# Patient Record
Sex: FEMALE | Race: OTHER | HISPANIC OR LATINO
[De-identification: names, ages, dates, MRNs, and addresses within clinical notes are randomized per-mention and may not be internally consistent; named-entity substitution may affect disease eponyms.]

---

## 2017-02-09 ENCOUNTER — TRANSCRIPTION ENCOUNTER (OUTPATIENT)
Age: 52
End: 2017-02-09

## 2017-05-04 ENCOUNTER — APPOINTMENT (OUTPATIENT)
Dept: FAMILY MEDICINE | Facility: CLINIC | Age: 52
End: 2017-05-04

## 2017-11-02 ENCOUNTER — APPOINTMENT (OUTPATIENT)
Dept: FAMILY MEDICINE | Facility: CLINIC | Age: 52
End: 2017-11-02
Payer: COMMERCIAL

## 2017-11-02 VITALS
TEMPERATURE: 98.1 F | BODY MASS INDEX: 21.62 KG/M2 | WEIGHT: 122 LBS | OXYGEN SATURATION: 99 % | HEART RATE: 89 BPM | DIASTOLIC BLOOD PRESSURE: 64 MMHG | HEIGHT: 63 IN | SYSTOLIC BLOOD PRESSURE: 110 MMHG

## 2017-11-02 DIAGNOSIS — R53.83 OTHER FATIGUE: ICD-10-CM

## 2017-11-02 DIAGNOSIS — L25.8 UNSPECIFIED CONTACT DERMATITIS DUE TO OTHER AGENTS: ICD-10-CM

## 2017-11-02 DIAGNOSIS — Z00.00 ENCOUNTER FOR GENERAL ADULT MEDICAL EXAMINATION W/OUT ABNORMAL FINDINGS: ICD-10-CM

## 2017-11-02 DIAGNOSIS — Z23 ENCOUNTER FOR IMMUNIZATION: ICD-10-CM

## 2017-11-02 PROCEDURE — 90686 IIV4 VACC NO PRSV 0.5 ML IM: CPT

## 2017-11-02 PROCEDURE — 99203 OFFICE O/P NEW LOW 30 MIN: CPT | Mod: 25

## 2017-11-02 PROCEDURE — 99213 OFFICE O/P EST LOW 20 MIN: CPT | Mod: 25

## 2017-11-02 PROCEDURE — G0008: CPT

## 2017-11-22 ENCOUNTER — APPOINTMENT (OUTPATIENT)
Dept: FAMILY MEDICINE | Facility: CLINIC | Age: 52
End: 2017-11-22

## 2017-11-30 DIAGNOSIS — E55.9 VITAMIN D DEFICIENCY, UNSPECIFIED: ICD-10-CM

## 2020-10-23 ENCOUNTER — EMERGENCY (EMERGENCY)
Facility: HOSPITAL | Age: 55
LOS: 1 days | Discharge: HOME | End: 2020-10-23
Attending: EMERGENCY MEDICINE
Payer: COMMERCIAL

## 2020-10-23 VITALS
RESPIRATION RATE: 20 BRPM | OXYGEN SATURATION: 98 % | HEART RATE: 81 BPM | DIASTOLIC BLOOD PRESSURE: 75 MMHG | SYSTOLIC BLOOD PRESSURE: 119 MMHG

## 2020-10-23 LAB
ALBUMIN SERPL ELPH-MCNC: 4.3 G/DL — SIGNIFICANT CHANGE UP (ref 3.3–5.2)
ALP SERPL-CCNC: 83 U/L — SIGNIFICANT CHANGE UP (ref 40–120)
ALT FLD-CCNC: 19 U/L — SIGNIFICANT CHANGE UP
ANION GAP SERPL CALC-SCNC: 13 MMOL/L — SIGNIFICANT CHANGE UP (ref 5–17)
AST SERPL-CCNC: 23 U/L — SIGNIFICANT CHANGE UP
BASOPHILS # BLD AUTO: 0.02 K/UL — SIGNIFICANT CHANGE UP (ref 0–0.2)
BASOPHILS NFR BLD AUTO: 0.5 % — SIGNIFICANT CHANGE UP (ref 0–2)
BILIRUB SERPL-MCNC: 0.3 MG/DL — LOW (ref 0.4–2)
BUN SERPL-MCNC: 14 MG/DL — SIGNIFICANT CHANGE UP (ref 8–20)
CALCIUM SERPL-MCNC: 9.2 MG/DL — SIGNIFICANT CHANGE UP (ref 8.6–10.2)
CHLORIDE SERPL-SCNC: 103 MMOL/L — SIGNIFICANT CHANGE UP (ref 98–107)
CO2 SERPL-SCNC: 25 MMOL/L — SIGNIFICANT CHANGE UP (ref 22–29)
CREAT SERPL-MCNC: 0.75 MG/DL — SIGNIFICANT CHANGE UP (ref 0.5–1.3)
EOSINOPHIL # BLD AUTO: 0.12 K/UL — SIGNIFICANT CHANGE UP (ref 0–0.5)
EOSINOPHIL NFR BLD AUTO: 2.7 % — SIGNIFICANT CHANGE UP (ref 0–6)
GLUCOSE SERPL-MCNC: 113 MG/DL — HIGH (ref 70–99)
HCG SERPL-ACNC: <4 MIU/ML — SIGNIFICANT CHANGE UP
HCT VFR BLD CALC: 37.6 % — SIGNIFICANT CHANGE UP (ref 34.5–45)
HGB BLD-MCNC: 12.6 G/DL — SIGNIFICANT CHANGE UP (ref 11.5–15.5)
IMM GRANULOCYTES NFR BLD AUTO: 0 % — SIGNIFICANT CHANGE UP (ref 0–1.5)
LYMPHOCYTES # BLD AUTO: 2.17 K/UL — SIGNIFICANT CHANGE UP (ref 1–3.3)
LYMPHOCYTES # BLD AUTO: 48.9 % — HIGH (ref 13–44)
MCHC RBC-ENTMCNC: 31.5 PG — SIGNIFICANT CHANGE UP (ref 27–34)
MCHC RBC-ENTMCNC: 33.5 GM/DL — SIGNIFICANT CHANGE UP (ref 32–36)
MCV RBC AUTO: 94 FL — SIGNIFICANT CHANGE UP (ref 80–100)
MONOCYTES # BLD AUTO: 0.35 K/UL — SIGNIFICANT CHANGE UP (ref 0–0.9)
MONOCYTES NFR BLD AUTO: 7.9 % — SIGNIFICANT CHANGE UP (ref 2–14)
NEUTROPHILS # BLD AUTO: 1.78 K/UL — LOW (ref 1.8–7.4)
NEUTROPHILS NFR BLD AUTO: 40 % — LOW (ref 43–77)
PLATELET # BLD AUTO: 200 K/UL — SIGNIFICANT CHANGE UP (ref 150–400)
POTASSIUM SERPL-MCNC: 4.1 MMOL/L — SIGNIFICANT CHANGE UP (ref 3.5–5.3)
POTASSIUM SERPL-SCNC: 4.1 MMOL/L — SIGNIFICANT CHANGE UP (ref 3.5–5.3)
PROT SERPL-MCNC: 6.9 G/DL — SIGNIFICANT CHANGE UP (ref 6.6–8.7)
RBC # BLD: 4 M/UL — SIGNIFICANT CHANGE UP (ref 3.8–5.2)
RBC # FLD: 13.2 % — SIGNIFICANT CHANGE UP (ref 10.3–14.5)
SODIUM SERPL-SCNC: 141 MMOL/L — SIGNIFICANT CHANGE UP (ref 135–145)
WBC # BLD: 4.44 K/UL — SIGNIFICANT CHANGE UP (ref 3.8–10.5)
WBC # FLD AUTO: 4.44 K/UL — SIGNIFICANT CHANGE UP (ref 3.8–10.5)

## 2020-10-23 PROCEDURE — 85025 COMPLETE CBC W/AUTO DIFF WBC: CPT

## 2020-10-23 PROCEDURE — 96374 THER/PROPH/DIAG INJ IV PUSH: CPT

## 2020-10-23 PROCEDURE — 80053 COMPREHEN METABOLIC PANEL: CPT

## 2020-10-23 PROCEDURE — 36415 COLL VENOUS BLD VENIPUNCTURE: CPT

## 2020-10-23 PROCEDURE — 96361 HYDRATE IV INFUSION ADD-ON: CPT

## 2020-10-23 PROCEDURE — 84702 CHORIONIC GONADOTROPIN TEST: CPT

## 2020-10-23 PROCEDURE — 99284 EMERGENCY DEPT VISIT MOD MDM: CPT | Mod: 25

## 2020-10-23 PROCEDURE — 96375 TX/PRO/DX INJ NEW DRUG ADDON: CPT

## 2020-10-23 PROCEDURE — 99284 EMERGENCY DEPT VISIT MOD MDM: CPT

## 2020-10-23 RX ORDER — SODIUM CHLORIDE 9 MG/ML
1000 INJECTION INTRAMUSCULAR; INTRAVENOUS; SUBCUTANEOUS ONCE
Refills: 0 | Status: COMPLETED | OUTPATIENT
Start: 2020-10-23 | End: 2020-10-23

## 2020-10-23 RX ORDER — FAMOTIDINE 10 MG/ML
20 INJECTION INTRAVENOUS ONCE
Refills: 0 | Status: COMPLETED | OUTPATIENT
Start: 2020-10-23 | End: 2020-10-23

## 2020-10-23 RX ADMIN — SODIUM CHLORIDE 1000 MILLILITER(S): 9 INJECTION INTRAMUSCULAR; INTRAVENOUS; SUBCUTANEOUS at 16:00

## 2020-10-23 RX ADMIN — FAMOTIDINE 20 MILLIGRAM(S): 10 INJECTION INTRAVENOUS at 14:00

## 2020-10-23 RX ADMIN — Medication 125 MILLIGRAM(S): at 14:00

## 2020-10-23 RX ADMIN — SODIUM CHLORIDE 1000 MILLILITER(S): 9 INJECTION INTRAMUSCULAR; INTRAVENOUS; SUBCUTANEOUS at 14:16

## 2020-10-23 NOTE — ED ADULT NURSE NOTE - OBJECTIVE STATEMENT
A&Ox3, resp wnl, c/o allergic reaction to face,periorbital swelling noted, whole face swollen denies difficulty breathing,eyes almost swollen shut, used coconut oil on face last night and today

## 2020-10-23 NOTE — ED PROVIDER NOTE - PHYSICAL EXAMINATION
Const: Awake, alert and oriented. In no acute distress. Well appearing.  HEENT: NC/AT. Moist mucous membranes. Periorbital edema b/l. Posterior oropharnyx clear, uvula midline and non-edematous, no angioedema present.  Eyes: No scleral icterus. EOMI.  Neck:. Soft and supple. Full ROM without pain.  Cardiac: Regular rate and regular rhythm. +S1/S2. No murmurs. Peripheral pulses 2+ and symmetric. No LE edema.  Resp: Speaking in full sentences. No evidence of respiratory distress. No wheezes, rales or rhonchi.  Abd: Soft, non-tender, non-distended. Normal bowel sounds in all 4 quadrants. No guarding or rebound.  Back: Spine midline and non-tender. No CVAT.  Skin: Erythematous rash to face, neck, chest, abdomen, back and upper extremities.   Neuro: Awake, alert & oriented x 3. Moves all extremities symmetrically.

## 2020-10-23 NOTE — ED PROVIDER NOTE - NS ED ROS FT
Const: Denies fever, chills  HEENT: Denies blurry vision, sore throat  Neck: Denies neck pain/stiffness  Resp: Denies coughing, SOB  Cardiovascular: Denies CP, palpitations, LE edema  GI: Denies nausea, vomiting, abdominal pain, diarrhea, constipation, blood in stool  : Denies urinary frequency/urgency/dysuria, hematuria  MSK: Denies back pain  Neuro: Denies HA, dizziness, numbness, weakness  Skin: +  rashes.

## 2020-10-23 NOTE — ED PROVIDER NOTE - CLINICAL SUMMARY MEDICAL DECISION MAKING FREE TEXT BOX
54 y/o F presents for allergic reaction after she used coconut oil on her face, arms, chest and back. Rash appeared in the distribution of the oil only, b/l perioral edema, no airway involvement. Patient took benadryl prior to arrival, pepcid, solu-medrol and fluids ordered. Will reassess, send Rx for prednisone and patient can follow up with PMD.

## 2020-10-23 NOTE — ED ADULT TRIAGE NOTE - CHIEF COMPLAINT QUOTE
Patient A&Ox4 complaining of allergic reaction to organic coconut oil. Presents with periorbital edema, airway patent, negative audible wheezing. Patient brought to CC area for immediate eval.

## 2020-10-23 NOTE — ED PROVIDER NOTE - NSFOLLOWUPINSTRUCTIONS_ED_ALL_ED_FT
General Allergic Reaction    WHAT YOU NEED TO KNOW:    An allergic reaction is your body's response to an allergen. Allergens include medicines, food, insect stings, animal dander, mold, latex, chemicals, and dust mites. Pollen from trees, grass, and weeds can also cause an allergic reaction. An allergic reaction can range from mild to severe.    DISCHARGE INSTRUCTIONS:    Call 911 for signs or symptoms of anaphylaxis, such as trouble breathing, swelling in your mouth or throat, or wheezing. You may also have itching, a rash, hives, or feel like you are going to faint.    Return to the emergency department if:   •You have a skin rash, hives, swelling, or itching that is starting to get worse.      •Your throat tightens, or your lips or tongue swell.      •You have trouble swallowing or speaking.      •You have worsening nausea, diarrhea, or abdominal cramps, or you are vomiting.      •You have chest pain or tightness.      Contact your healthcare provider if:   •You have questions or concerns about your condition or care.          Medicines: You may need any of the following:   •Medicines may be given to relieve certain allergy symptoms such as itching, sneezing, and swelling. You may take them as a pill or use drops in your nose or eyes. Topical treatments may be given to put directly on your skin to help decrease itching or swelling.      •Epinephrine may be prescribed if you are at risk for anaphylaxis. This is a severe allergic reaction that can be life-threatening. Your healthcare provider will tell you if you need to keep epinephrine with you. You will be taught when and how to use it.      •Take your medicine as directed. Contact your healthcare provider if you think your medicine is not helping or if you have side effects. Tell him of her if you are allergic to any medicine. Keep a list of the medicines, vitamins, and herbs you take. Include the amounts, and when and why you take them. Bring the list or the pill bottles to follow-up visits. Carry your medicine list with you in case of an emergency.      Follow up with your healthcare provider as directed: Write down your questions so you remember to ask them during your visits.     Manage your symptoms:   •Avoid allergens. You may need to have allergy testing with your healthcare provider or a specialist to find your allergens.      •Use cold compresses on your skin or eyes. This will help soothe skin or eyes affected by the allergic reaction. You can make a cold compress by soaking a washcloth in cool water. Wring out the extra water before you apply the washcloth.      •Rinse your nasal passages with a saline solution. Daily rinsing may help clear allergens out of your nose. Use distilled water if possible. You can also boil tap water and then let it cool before you use it. Do not use tap water without boiling it first.      •Do not smoke. Nicotine and other chemicals in cigarettes and cigars can make an allergic reaction worse, and can also cause lung damage. Ask your healthcare provider for information if you currently smoke and need help to quit. E-cigarettes or smokeless tobacco still contain nicotine. Talk to your healthcare provider before you use these products.

## 2020-10-23 NOTE — ED PROVIDER NOTE - OBJECTIVE STATEMENT
54 y/o F with no PMH presents complaining of an allergic reaction after she rubbed food grade coconut oil that her daughter uses on her hair all over her face and upper body in an attempt to treat a sunburn. She was working from homes and her eyes started burning, she rubbed her eyes and couldn't see clearly. She went into the bathroom and noticed her eyes were swollen, then became itchy on her chest, abdomen, back and arms where she had put the oil. She took 50 mg PO benadryl prior to coming to the ED. She denies difficulty swallowing, breathing, denies coughing, nausea, vomiting. Her eye swelling has improved after the benadryl. She has no known allergies, no recent changes in medications, clothes, sheets, soaps, foods, etc.  PMD: Tray

## 2020-10-23 NOTE — ED PROVIDER NOTE - PATIENT PORTAL LINK FT
You can access the FollowMyHealth Patient Portal offered by [facility] by registering at the following website: [email or web address]. By joining [employer]’s FollowMyHealth portal, you will also be able to view your health information using other applications (apps) compatible with our system.

## 2021-02-08 ENCOUNTER — EMERGENCY (EMERGENCY)
Facility: HOSPITAL | Age: 56
LOS: 1 days | Discharge: HOME | End: 2021-02-08
Attending: STUDENT IN AN ORGANIZED HEALTH CARE EDUCATION/TRAINING PROGRAM
Payer: COMMERCIAL

## 2021-02-08 VITALS
TEMPERATURE: 99 F | DIASTOLIC BLOOD PRESSURE: 86 MMHG | RESPIRATION RATE: 20 BRPM | HEART RATE: 95 BPM | SYSTOLIC BLOOD PRESSURE: 129 MMHG | HEIGHT: 63 IN | WEIGHT: 119.93 LBS | OXYGEN SATURATION: 98 %

## 2021-02-08 LAB — HCG UR QL: NEGATIVE — SIGNIFICANT CHANGE UP

## 2021-02-08 PROCEDURE — 72100 X-RAY EXAM L-S SPINE 2/3 VWS: CPT | Mod: 26

## 2021-02-08 PROCEDURE — 96372 THER/PROPH/DIAG INJ SC/IM: CPT

## 2021-02-08 PROCEDURE — 99284 EMERGENCY DEPT VISIT MOD MDM: CPT

## 2021-02-08 PROCEDURE — 81025 URINE PREGNANCY TEST: CPT

## 2021-02-08 PROCEDURE — 99283 EMERGENCY DEPT VISIT LOW MDM: CPT | Mod: 25

## 2021-02-08 PROCEDURE — 72100 X-RAY EXAM L-S SPINE 2/3 VWS: CPT

## 2021-02-08 RX ORDER — METHOCARBAMOL 500 MG/1
2 TABLET, FILM COATED ORAL
Qty: 30 | Refills: 0
Start: 2021-02-08 | End: 2021-02-12

## 2021-02-08 RX ORDER — KETOROLAC TROMETHAMINE 30 MG/ML
60 SYRINGE (ML) INJECTION ONCE
Refills: 0 | Status: DISCONTINUED | OUTPATIENT
Start: 2021-02-08 | End: 2021-02-08

## 2021-02-08 RX ORDER — METHOCARBAMOL 500 MG/1
1500 TABLET, FILM COATED ORAL ONCE
Refills: 0 | Status: COMPLETED | OUTPATIENT
Start: 2021-02-08 | End: 2021-02-08

## 2021-02-08 RX ORDER — IBUPROFEN 200 MG
1 TABLET ORAL
Qty: 20 | Refills: 0
Start: 2021-02-08 | End: 2021-02-12

## 2021-02-08 RX ADMIN — Medication 60 MILLIGRAM(S): at 10:36

## 2021-02-08 RX ADMIN — METHOCARBAMOL 1500 MILLIGRAM(S): 500 TABLET, FILM COATED ORAL at 10:36

## 2021-02-08 NOTE — ED PROVIDER NOTE - PATIENT PORTAL LINK FT
You can access the FollowMyHealth Patient Portal offered by [facility] by registering at the following website: [email or web address]. By joining [employer]’s FollowMyHealth portal, you will also be able to view your health information using other applications (apps) compatible with our system.

## 2021-02-08 NOTE — ED PROVIDER NOTE - OBJECTIVE STATEMENT
This is a 55 year old female with c/o back pain since yesterday.  She reports was at friends house watching super bowl when slipped on stoop x 2 stairs and landed on buttock.  She notes pain radiates down L leg.  She denies any h/o back pain.  Patient denies any n/v/d or any abdominal pain, recent travel or rashes.  Patient reports did not take any pain medication PTA.

## 2021-02-08 NOTE — ED PROVIDER NOTE - NS ED ROS FT
No fever/chills, No photophobia/eye pain/changes in vision, No ear pain/sore throat/dysphagia, No chest pain/palpitations, no SOB/cough/wheeze/stridor, No abdominal pain, No N/V/D, no dysuria/frequency/discharge, No neck/+back pain, no rash, no changes in neurological status/function.

## 2021-02-08 NOTE — ED PROVIDER NOTE - CARE PROVIDER_API CALL
Peyman Mays (DO)  Orthopaedic Surgery  [address] [place] [address], [address]  [place] [address]  Phone: [phone]  Fax: [phone]  Follow Up Time: Routine

## 2021-02-08 NOTE — ED ADULT NURSE NOTE - OBJECTIVE STATEMENT
Pt reports slipping and falling on ice last night and landing on her sacrum and left elbow. Pt has not taken anything for pain, pt states pain is worse when applying pressure while sitting.

## 2021-02-08 NOTE — ED PROVIDER NOTE - ATTENDING CONTRIBUTION TO CARE
55 yof no sig pmh c/o back pain since yesterday. Was walking to car last night and fell onto her left side on her back. Did not hit head. Woke up this morning with worsening back pain now going down to her left leg. Not on any blood thinners. Has been able to ambulate  AP - +left paraspinal tenderness, no midline tenderness. patient requesting imaging. pain control. 55 yof no sig pmh c/o back pain since yesterday. Was walking to car last night and fell onto her left side on her back. Did not hit head. Woke up this morning with worsening back pain now going down to her left leg. Not on any blood thinners. Has been able to ambulate.  AP - +left paraspinal tenderness, no midline tenderness. patient requesting imaging. pain control.

## 2021-02-09 ENCOUNTER — EMERGENCY (EMERGENCY)
Facility: HOSPITAL | Age: 56
LOS: 1 days | Discharge: HOME | End: 2021-02-09
Attending: EMERGENCY MEDICINE
Payer: COMMERCIAL

## 2021-02-09 VITALS
HEART RATE: 84 BPM | HEIGHT: 63 IN | RESPIRATION RATE: 20 BRPM | OXYGEN SATURATION: 100 % | WEIGHT: 119.93 LBS | DIASTOLIC BLOOD PRESSURE: 71 MMHG | TEMPERATURE: 97 F | SYSTOLIC BLOOD PRESSURE: 109 MMHG

## 2021-02-09 PROCEDURE — 99283 EMERGENCY DEPT VISIT LOW MDM: CPT

## 2021-02-09 PROCEDURE — 99284 EMERGENCY DEPT VISIT MOD MDM: CPT

## 2021-02-09 RX ORDER — IBUPROFEN 200 MG
600 TABLET ORAL ONCE
Refills: 0 | Status: COMPLETED | OUTPATIENT
Start: 2021-02-09 | End: 2021-02-09

## 2021-02-09 RX ORDER — CYCLOBENZAPRINE HYDROCHLORIDE 10 MG/1
1 TABLET, FILM COATED ORAL
Qty: 12 | Refills: 0
Start: 2021-02-09 | End: 2021-02-11

## 2021-02-09 RX ADMIN — Medication 600 MILLIGRAM(S): at 19:42

## 2021-02-09 NOTE — ED PROVIDER NOTE - PATIENT PORTAL LINK FT
You can access the FollowMyHealth Patient Portal offered by [facility] by registering at the following website: [email or web address]. By joining [employer]’s FollowMyHealth portal, you will also be able to view your health information using other applications (apps) compatible with our system.

## 2021-02-09 NOTE — ED PROVIDER NOTE - CARE PROVIDER_API CALL
Adam Tellez (DO)  Orthopaedic Surgery  [address] [place] [address]  Phone: [phone]  Fax: [phone]  Follow Up Time:

## 2021-02-09 NOTE — ED PROVIDER NOTE - OBJECTIVE STATEMENT
54 y/o female with no pmhx presents c/o back pain, radiates down her left leg. Pt states she fell 2 days ago fell onto her buttocks on a step walking out of her friends house. She reported here the next day. She states she had imaging performed which was negative, was given medications here that alleviated her pain, and was told to follow up with her PCP. She followed up and has an appointment for MRI on Thursday of this week. She is here today requesting the same "injection' she received the last time she was here. She has been taking the muscles relaxant at home with no relief however she has not been combining it with ibuprofen. denies numbness tingling. denies LOC and head trauma.

## 2021-02-09 NOTE — ED ADULT TRIAGE NOTE - CHIEF COMPLAINT QUOTE
Patient arrived to ED today with c/o back pains.  Patient was seen in the ED yesterday for the same and reports the pain has not gone away so came to ED.

## 2021-02-09 NOTE — ED PROVIDER NOTE - ATTENDING CONTRIBUTION TO CARE
Krissy: I performed a face to face bedside interview with patient regarding history of present illness, review of symptoms and past medical history. I completed an independent physical exam.  I have discussed patient's plan of care with advanced care provider.   I agree with note as stated above including HISTORY OF PRESENT ILLNESS, HIV, PAST MEDICAL/SURGICAL/FAMILY/SOCIAL HISTORY, ALLERGIES AND HOME MEDICATIONS, REVIEW OF SYSTEMS, PHYSICAL EXAM, MEDICAL DECISION MAKING and any PROGRESS NOTES during the time I functioned as the attending physician for this patient  unless otherwise noted. My brief assessment is as follows: 55F p/w back pain s/p fall 2 days ago. Was seen in ED yesterday for same. Is scheduled for MRI on Thursday. Is requesting the shot she got in ED yesterday (toradol).   +tt L paralumbar, no midline ttp. Plan for meds/dc. Return precautions given.

## 2021-02-09 NOTE — ED PROVIDER NOTE - NSFOLLOWUPINSTRUCTIONS_ED_ALL_ED_FT
Go to MRI Thursday, follow plan made with your doctor   Follow up with ortho   take ibuprofen in conjunction with flexeril     feel better !!!       Back Pain    Back pain is very common in adults. The cause of back pain is rarely dangerous and the pain often gets better over time. The cause of your back pain may not be known and may include strain of muscles or ligaments, degeneration of the spinal disks, or arthritis. Occasionally the pain may radiate down your leg(s). Over-the-counter medicines to reduce pain and inflammation are often the most helpful. Stretching and remaining active frequently helps the healing process.     SEEK IMMEDIATE MEDICAL CARE IF YOU HAVE ANY OF THE FOLLOWING SYMPTOMS: bowel or bladder control problems, unusual weakness or numbness in your arms or legs, nausea or vomiting, abdominal pain, fever, dizziness/lightheadedness.

## 2021-02-09 NOTE — ED PROVIDER NOTE - PROGRESS NOTE DETAILS
pt admits to some relief. is ambulatory. alternate muscle relaxant sent to her pharmacy - [facility]

## 2021-02-11 ENCOUNTER — APPOINTMENT (OUTPATIENT)
Dept: FAMILY MEDICINE | Facility: CLINIC | Age: 56
End: 2021-02-11
Payer: COMMERCIAL

## 2021-02-11 VITALS
DIASTOLIC BLOOD PRESSURE: 81 MMHG | SYSTOLIC BLOOD PRESSURE: 128 MMHG | TEMPERATURE: 97.2 F | WEIGHT: 137 LBS | BODY MASS INDEX: 24.27 KG/M2 | HEIGHT: 63 IN | OXYGEN SATURATION: 100 % | HEART RATE: 92 BPM

## 2021-02-11 DIAGNOSIS — S39.92XA UNSPECIFIED INJURY OF LOWER BACK, INITIAL ENCOUNTER: ICD-10-CM

## 2021-02-11 DIAGNOSIS — M54.5 LOW BACK PAIN: ICD-10-CM
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
## 2021-02-11 DIAGNOSIS — L23.9 ALLERGIC CONTACT DERMATITIS, UNSPECIFIED CAUSE: ICD-10-CM

## 2021-02-11 DIAGNOSIS — M54.17 RADICULOPATHY, LUMBOSACRAL REGION: ICD-10-CM

## 2021-02-11 PROCEDURE — 99072 ADDL SUPL MATRL&STAF TM PHE: CPT

## 2021-02-11 PROCEDURE — 99203 OFFICE O/P NEW LOW 30 MIN: CPT

## 2021-02-11 RX ORDER — ACETAMINOPHEN 325 MG/1
TABLET, FILM COATED ORAL
Refills: 0 | Status: ACTIVE | COMMUNITY

## 2021-02-11 RX ORDER — PREDNISONE 10 MG/1
10 TABLET ORAL
Qty: 31 | Refills: 0 | Status: DISCONTINUED | COMMUNITY
Start: 2017-11-02 | End: 2021-02-11

## 2021-02-11 RX ORDER — BETAMETHASONE DIPROPIONATE 0.5 MG/G
0.05 CREAM TOPICAL TWICE DAILY
Qty: 1 | Refills: 0 | Status: ACTIVE | COMMUNITY
Start: 2021-02-11 | End: 1900-01-01

## 2021-02-12 NOTE — HISTORY OF PRESENT ILLNESS
[Family Member] : family member [FreeTextEntry8] : New patient, Ms. JACKIE FUNES is a 56 year female who presents to office c/o pain in left lower back and hip that radiates down the posterior-lateral left leg x 5 days.Pt. states she slipped on ice and fell down the stairs and fell right on her butt and the crease of the stairs hit her lower back. Patient was in the ED with same complaint, x-ray was negative. Patient has continued to have lumbosacral pain which is making it difficult to ambulated the LLE. No difficulty or issues with urinary habits, no numbness or tingling.\par \par Patient is also complaining of red itchy rash at nape after using new shampoo

## 2021-02-12 NOTE — PHYSICAL EXAM
[No Acute Distress] : no acute distress [Well Nourished] : well nourished [Well Developed] : well developed [Well-Appearing] : well-appearing [Normal Sclera/Conjunctiva] : normal sclera/conjunctiva [Normal Outer Ear/Nose] : the outer ears and nose were normal in appearance [Supple] : supple [No Respiratory Distress] : no respiratory distress  [No Accessory Muscle Use] : no accessory muscle use [No Rash] : no rash [Coordination Grossly Intact] : coordination grossly intact [No Focal Deficits] : no focal deficits [Normal Gait] : normal gait [Normal Affect] : the affect was normal [Normal Insight/Judgement] : insight and judgment were intact [de-identified] : +SLR left lower extremity

## 2021-09-14 ENCOUNTER — TRANSCRIPTION ENCOUNTER (OUTPATIENT)
Age: 56
End: 2021-09-14

## 2024-09-08 NOTE — ED ADULT NURSE NOTE - NS ED NURSE LEVEL OF CONSCIOUSNESS AFFECT
Patient awake and alert, resting in stretcher with parent at bedside. + sensation, movement, and pulses in extremity. Patient denies pain, states it only hurts when touched. VS as noted. Safety and comfort maintained. Assessment ongoing
Patient awake and alert, resting in stretcher with parent at bedside. + sensation, pulses, and movement in extremity, patient denies pain. VS as noted. Safety and comfort maintained. Assessment ongoing
Calm